# Patient Record
Sex: FEMALE | Race: WHITE | NOT HISPANIC OR LATINO | Employment: FULL TIME | URBAN - METROPOLITAN AREA
[De-identification: names, ages, dates, MRNs, and addresses within clinical notes are randomized per-mention and may not be internally consistent; named-entity substitution may affect disease eponyms.]

---

## 2019-02-11 ENCOUNTER — OFFICE VISIT (OUTPATIENT)
Dept: URGENT CARE | Facility: PHYSICIAN GROUP | Age: 54
End: 2019-02-11
Payer: COMMERCIAL

## 2019-02-11 VITALS
BODY MASS INDEX: 32.99 KG/M2 | DIASTOLIC BLOOD PRESSURE: 70 MMHG | OXYGEN SATURATION: 93 % | SYSTOLIC BLOOD PRESSURE: 112 MMHG | HEART RATE: 105 BPM | TEMPERATURE: 97.4 F | HEIGHT: 65 IN | WEIGHT: 198 LBS

## 2019-02-11 DIAGNOSIS — J02.9 PHARYNGITIS, UNSPECIFIED ETIOLOGY: ICD-10-CM

## 2019-02-11 PROCEDURE — 99204 OFFICE O/P NEW MOD 45 MIN: CPT | Performed by: FAMILY MEDICINE

## 2019-02-11 RX ORDER — AZITHROMYCIN 250 MG/1
TABLET, FILM COATED ORAL
Qty: 6 TAB | Refills: 0 | Status: SHIPPED | OUTPATIENT
Start: 2019-02-11 | End: 2023-04-21

## 2019-02-11 ASSESSMENT — ENCOUNTER SYMPTOMS
SWOLLEN GLANDS: 1
NAUSEA: 0
HOARSE VOICE: 0
VOMITING: 0
TROUBLE SWALLOWING: 1
SHORTNESS OF BREATH: 0
DIZZINESS: 0
STRIDOR: 0
SORE THROAT: 0
MYALGIAS: 0
FEVER: 1
CHILLS: 1
EYE PAIN: 0

## 2019-02-11 NOTE — PROGRESS NOTES
"  Subjective:     Maddie Bey is a 53 y.o. female who presents for Pharyngitis (fever x 3 days)       Pharyngitis    This is a new problem. The current episode started in the past 7 days. The problem has been rapidly worsening. The pain is severe. Associated symptoms include swollen glands and trouble swallowing. Pertinent negatives include no hoarse voice, shortness of breath, stridor or vomiting.   History reviewed. No pertinent past medical history.History reviewed. No pertinent surgical history.  Social History     Social History   • Marital status:      Spouse name: N/A   • Number of children: N/A   • Years of education: N/A     Occupational History   • Not on file.     Social History Main Topics   • Smoking status: Former Smoker   • Smokeless tobacco: Never Used   • Alcohol use No   • Drug use: No   • Sexual activity: Not on file     Other Topics Concern   • Not on file     Social History Narrative   • No narrative on file      Family History   Problem Relation Age of Onset   • Heart Disease Paternal Grandfather    • Stroke Neg Hx     Review of Systems   Constitutional: Positive for chills and fever.   HENT: Positive for trouble swallowing. Negative for hoarse voice and sore throat.    Eyes: Negative for pain.   Respiratory: Negative for shortness of breath and stridor.    Cardiovascular: Negative for chest pain.   Gastrointestinal: Negative for nausea and vomiting.   Genitourinary: Negative for hematuria.   Musculoskeletal: Negative for myalgias.   Skin: Negative for rash.   Neurological: Negative for dizziness.     Allergies   Allergen Reactions   • Penicillins Anaphylaxis     Anaphylaxis      Objective:   /70   Pulse (!) 105   Temp 36.3 °C (97.4 °F) (Temporal)   Ht 1.651 m (5' 5\")   Wt 89.8 kg (198 lb)   SpO2 93%   BMI 32.95 kg/m²   Physical Exam   Constitutional: She is oriented to person, place, and time. She appears well-developed and well-nourished. No distress.   HENT:   Head: " Normocephalic and atraumatic.   Mouth/Throat: Uvula is midline. Posterior oropharyngeal edema and posterior oropharyngeal erythema present. No tonsillar abscesses.   Eyes: Pupils are equal, round, and reactive to light. Conjunctivae and EOM are normal.   Cardiovascular: Normal rate and regular rhythm.    No murmur heard.  Pulmonary/Chest: Effort normal and breath sounds normal. No respiratory distress.   Abdominal: Soft. She exhibits no distension. There is no tenderness.   Lymphadenopathy:     She has cervical adenopathy.        Right cervical: Superficial cervical adenopathy present.        Left cervical: Superficial cervical adenopathy present.        Right: No supraclavicular adenopathy present.        Left: No supraclavicular adenopathy present.   Neurological: She is alert and oriented to person, place, and time. She has normal reflexes. No sensory deficit.   Skin: Skin is warm and dry.   Psychiatric: She has a normal mood and affect.         Assessment/Plan:   Assessment    1. Pharyngitis, unspecified etiology  - azithromycin (ZITHROMAX) 250 MG Tab; Take 2 tablets by mouth on day one. Take one tablet by mouth the remaining days until gone  Dispense: 6 Tab; Refill: 0    Differential diagnosis, natural history, supportive care, and indications for immediate follow-up discussed.

## 2022-12-14 ENCOUNTER — OFFICE VISIT (OUTPATIENT)
Dept: URGENT CARE | Facility: PHYSICIAN GROUP | Age: 57
End: 2022-12-14
Payer: COMMERCIAL

## 2022-12-14 VITALS
DIASTOLIC BLOOD PRESSURE: 78 MMHG | BODY MASS INDEX: 31.39 KG/M2 | RESPIRATION RATE: 16 BRPM | SYSTOLIC BLOOD PRESSURE: 118 MMHG | HEART RATE: 92 BPM | WEIGHT: 188.4 LBS | HEIGHT: 65 IN | OXYGEN SATURATION: 96 % | TEMPERATURE: 98.9 F

## 2022-12-14 DIAGNOSIS — R59.9 SWOLLEN LYMPH NODES: ICD-10-CM

## 2022-12-14 DIAGNOSIS — J03.90 TONSILLITIS: ICD-10-CM

## 2022-12-14 DIAGNOSIS — J02.9 SORE THROAT: ICD-10-CM

## 2022-12-14 LAB
INT CON NEG: NEGATIVE
INT CON POS: POSITIVE
S PYO AG THROAT QL: NEGATIVE

## 2022-12-14 PROCEDURE — 87880 STREP A ASSAY W/OPTIC: CPT | Performed by: NURSE PRACTITIONER

## 2022-12-14 PROCEDURE — 99213 OFFICE O/P EST LOW 20 MIN: CPT | Performed by: NURSE PRACTITIONER

## 2022-12-14 RX ORDER — PREDNISONE 20 MG/1
20 TABLET ORAL DAILY
Qty: 5 TABLET | Refills: 0 | Status: SHIPPED | OUTPATIENT
Start: 2022-12-14 | End: 2022-12-19

## 2022-12-14 RX ORDER — CLINDAMYCIN HYDROCHLORIDE 300 MG/1
300 CAPSULE ORAL 3 TIMES DAILY
Qty: 30 CAPSULE | Refills: 0 | Status: SHIPPED | OUTPATIENT
Start: 2022-12-14 | End: 2022-12-24

## 2022-12-14 ASSESSMENT — ENCOUNTER SYMPTOMS
NAUSEA: 0
DIARRHEA: 0
VOMITING: 0
CHILLS: 0
MYALGIAS: 0
NECK PAIN: 0
SPUTUM PRODUCTION: 0
EYE REDNESS: 0
FEVER: 0
CONSTIPATION: 0
COUGH: 1
WHEEZING: 0
ABDOMINAL PAIN: 0
DIZZINESS: 0
HEADACHES: 0
WEAKNESS: 0
EYE DISCHARGE: 0
SORE THROAT: 1
SHORTNESS OF BREATH: 0
SINUS PAIN: 0

## 2022-12-14 NOTE — PROGRESS NOTES
Subjective     Maddie Bey is a 56 y.o. female who presents with Adenopathy and Pharyngitis (Congestion, dayquill, decongestant)            Pharyngitis   Associated symptoms include congestion and coughing. Pertinent negatives include no abdominal pain, diarrhea, ear pain, headaches, neck pain, shortness of breath or vomiting. Experiencing sore throat with swollen lymph nodes, nasal congestion, postnasal drainage and sore throat x1 week.  Using over-the-counter oral multisymptom decongestant and DayQuil.  Salt water gargle twice daily.  No nasal spray or rinse.  Denies fever, malaise or body aches, mild cough from postnasal drainage.    PMH:  has no past medical history of Asthma, Cancer ([facility]), or Diabetes ([facility]).  MEDS:   Current Outpatient Medications:     azithromycin (ZITHROMAX) 250 MG Tab, Take 2 tablets by mouth on day one. Take one tablet by mouth the remaining days until gone, Disp: 6 Tab, Rfl: 0  ALLERGIES:   Allergies   Allergen Reactions    Penicillins Anaphylaxis     Anaphylaxis     SURGHX: History reviewed. No pertinent surgical history.  SOCHX:  reports that she has quit smoking. She has never used smokeless tobacco. She reports that she does not drink alcohol and does not use drugs.  FH: Family history was reviewed, no pertinent findings to report      Review of Systems   Constitutional:  Negative for chills, fever and malaise/fatigue.   HENT:  Positive for congestion and sore throat. Negative for ear pain and sinus pain.    Eyes:  Negative for discharge and redness.   Respiratory:  Positive for cough. Negative for sputum production, shortness of breath and wheezing.    Gastrointestinal:  Negative for abdominal pain, constipation, diarrhea, nausea and vomiting.   Musculoskeletal:  Negative for myalgias and neck pain.   Skin:  Negative for itching and rash.   Neurological:  Negative for dizziness, weakness and headaches.   Endo/Heme/Allergies:  Negative for environmental allergies.   All other systems  "reviewed and are negative.           Objective     /78   Pulse 92   Temp 37.2 °C (98.9 °F) (Temporal)   Resp 16   Ht 1.651 m (5' 5\")   Wt 85.5 kg (188 lb 6.4 oz)   SpO2 96%   BMI 31.35 kg/m²      Physical Exam  Vitals reviewed.   Constitutional:       General: She is awake. She is not in acute distress.     Appearance: Normal appearance. She is well-developed. She is not ill-appearing, toxic-appearing or diaphoretic.   HENT:      Head: Normocephalic.      Right Ear: Ear canal and external ear normal. A middle ear effusion is present.      Left Ear: Ear canal and external ear normal. A middle ear effusion is present.      Nose: Mucosal edema, congestion and rhinorrhea present.      Mouth/Throat:      Lips: Pink.      Mouth: Mucous membranes are dry.      Pharynx: Uvula midline. Pharyngeal swelling and posterior oropharyngeal erythema present. No oropharyngeal exudate or uvula swelling.      Tonsils: No tonsillar exudate or tonsillar abscesses. 2+ on the right. 2+ on the left.   Eyes:      Conjunctiva/sclera: Conjunctivae normal.      Pupils: Pupils are equal, round, and reactive to light.   Cardiovascular:      Rate and Rhythm: Normal rate.   Pulmonary:      Effort: Pulmonary effort is normal.      Breath sounds: Normal breath sounds and air entry.   Musculoskeletal:         General: Normal range of motion.      Cervical back: Normal range of motion and neck supple.   Lymphadenopathy:      Head:      Right side of head: Submandibular and tonsillar adenopathy present.      Left side of head: Submandibular and tonsillar adenopathy present.      Cervical: No cervical adenopathy.   Skin:     General: Skin is warm and dry.   Neurological:      Mental Status: She is alert and oriented to person, place, and time.   Psychiatric:         Attention and Perception: Attention normal.         Mood and Affect: Mood normal.         Speech: Speech normal.         Behavior: Behavior normal. Behavior is cooperative. "                           Assessment & Plan        1. Sore throat    - POCT Rapid Strep A    2. Tonsillitis    - clindamycin (CLEOCIN) 300 MG Cap; Take 1 Capsule by mouth 3 times a day for 10 days. Take with food, probiotic and plenty of water throughout the day.  Dispense: 30 Capsule; Refill: 0    3. Swollen lymph nodes    - predniSONE (DELTASONE) 20 MG Tab; Take 1 Tablet by mouth every day for 5 days.    Dispense: 5 Tablet; Refill: 0    Opt to dispense clindamycin at this time as there is a severe allergic reaction to penicillins  Recommend to discontinue oral multisymptom decongestant at this time  -Maintain hydration/water intake  -May use over the counter Ibuprofen/Tylenol as needed for any fever, body aches or throat pain  -May take long acting antihistamine for seasonal allergy symptoms as needed  -May use over the counter saline nasal spray for nasal congestion as needed  -May use over the counter Nasacort/Flonase for nasal congestion as needed   -May use throat lozenges for throat discomfort as needed   -Change toothbrush after 24 hrs of initiating antibiotics   -May gargle with salt water up to 4x/day as needed for throat discomfort (1 tsp salt dissolved in 1 cup warm water)  -May drink smoothies for nutrition if too painful to swallow solid foods  -Monitor for any sinus pain/pressure with sinus congestion with thick mucus production, sinus headache, cough, shortness of breath, fever- need re-evaluation

## 2023-05-24 ENCOUNTER — OFFICE VISIT (OUTPATIENT)
Dept: URGENT CARE | Facility: PHYSICIAN GROUP | Age: 58
End: 2023-05-24
Payer: COMMERCIAL

## 2023-05-24 VITALS
OXYGEN SATURATION: 97 % | WEIGHT: 174 LBS | RESPIRATION RATE: 18 BRPM | BODY MASS INDEX: 29.71 KG/M2 | DIASTOLIC BLOOD PRESSURE: 62 MMHG | SYSTOLIC BLOOD PRESSURE: 124 MMHG | HEART RATE: 95 BPM | TEMPERATURE: 98.5 F | HEIGHT: 64 IN

## 2023-05-24 DIAGNOSIS — R19.00 ABDOMINAL SWELLING: ICD-10-CM

## 2023-05-24 PROCEDURE — 3074F SYST BP LT 130 MM HG: CPT | Performed by: FAMILY MEDICINE

## 2023-05-24 PROCEDURE — 99213 OFFICE O/P EST LOW 20 MIN: CPT | Performed by: FAMILY MEDICINE

## 2023-05-24 PROCEDURE — 3078F DIAST BP <80 MM HG: CPT | Performed by: FAMILY MEDICINE

## 2023-05-24 NOTE — PROGRESS NOTES
"  Subjective:      57 y.o. female presents to urgent care for lower quadrant abdominal swelling.  This for started about 1 month ago and has been happening intermittently since then.  There is no inciting event or trauma at that time.  She is unsure of alleviating or aggravating factors.  There is no associated pain.  She does have history of nephrectomy on that side.  Bowel movements are regular.  No changes to urinary urgency, frequency, dysuria, or hematuria.    She denies any other questions or concerns at this time.    Current problem list, medication, and past medical/surgical history were reviewed in Epic.    ROS  See HPI     Objective:      /62 (BP Location: Left arm, Patient Position: Sitting, BP Cuff Size: Adult)   Pulse 95   Temp 36.9 °C (98.5 °F) (Temporal)   Resp 18   Ht 1.626 m (5' 4\")   Wt 78.9 kg (174 lb)   SpO2 97%   BMI 29.87 kg/m²     Physical Exam  Constitutional:       General: She is not in acute distress.     Appearance: She is not diaphoretic.   Cardiovascular:      Rate and Rhythm: Normal rate and regular rhythm.      Heart sounds: Normal heart sounds.   Pulmonary:      Effort: Pulmonary effort is normal. No respiratory distress.      Breath sounds: Normal breath sounds.   Abdominal:      Comments: Scar around her right lower abdominal/torso noted.  No deformities or discolorations noted when patient is laying down to abdomen.  When she stands up the right lower abdominal side is more to the left.  It is firm to palpation.  No associated pain.   Neurological:      Mental Status: She is alert.   Psychiatric:         Mood and Affect: Affect normal.         Judgment: Judgment normal.       Assessment/Plan:     1. Abdominal swelling  CT has been ordered and scheduled for tomorrow which was the soonest available appointment.  We will treat appropriately once resulted.  - CT-ABDOMEN-PELVIS WITH; Future      Instructed to return to Urgent Care or nearest Emergency Department if " symptoms fail to improve, for any change in condition, further concerns, or new concerning symptoms. Patient states understanding of the plan of care and discharge instructions.    Shakira Schreiber M.D.

## 2023-05-25 ENCOUNTER — HOSPITAL ENCOUNTER (OUTPATIENT)
Dept: RADIOLOGY | Facility: MEDICAL CENTER | Age: 58
End: 2023-05-25
Attending: FAMILY MEDICINE
Payer: COMMERCIAL

## 2023-05-25 DIAGNOSIS — R19.00 ABDOMINAL SWELLING: ICD-10-CM

## 2023-05-25 PROCEDURE — 74177 CT ABD & PELVIS W/CONTRAST: CPT

## 2023-05-25 PROCEDURE — 700117 HCHG RX CONTRAST REV CODE 255: Performed by: FAMILY MEDICINE

## 2023-05-25 RX ADMIN — IOHEXOL 25 ML: 240 INJECTION, SOLUTION INTRATHECAL; INTRAVASCULAR; INTRAVENOUS; ORAL at 13:20

## 2023-05-25 RX ADMIN — IOHEXOL 75 ML: 350 INJECTION, SOLUTION INTRAVENOUS at 14:20

## 2024-05-01 ENCOUNTER — APPOINTMENT (OUTPATIENT)
Dept: RADIOLOGY | Facility: MEDICAL CENTER | Age: 59
End: 2024-05-01
Payer: COMMERCIAL

## 2024-05-10 ENCOUNTER — HOSPITAL ENCOUNTER (OUTPATIENT)
Dept: RADIOLOGY | Facility: MEDICAL CENTER | Age: 59
End: 2024-05-10
Attending: OBSTETRICS & GYNECOLOGY
Payer: COMMERCIAL

## 2024-05-10 DIAGNOSIS — Z12.39 SCREENING BREAST EXAMINATION: ICD-10-CM

## 2024-11-04 ENCOUNTER — OFFICE VISIT (OUTPATIENT)
Dept: URGENT CARE | Facility: PHYSICIAN GROUP | Age: 59
End: 2024-11-04
Payer: COMMERCIAL

## 2024-11-04 VITALS
OXYGEN SATURATION: 99 % | BODY MASS INDEX: 29.12 KG/M2 | HEIGHT: 66 IN | WEIGHT: 181.22 LBS | TEMPERATURE: 97.8 F | RESPIRATION RATE: 15 BRPM | HEART RATE: 66 BPM | DIASTOLIC BLOOD PRESSURE: 76 MMHG | SYSTOLIC BLOOD PRESSURE: 120 MMHG

## 2024-11-04 DIAGNOSIS — R51.9 ACUTE INTRACTABLE HEADACHE, UNSPECIFIED HEADACHE TYPE: ICD-10-CM

## 2024-11-04 DIAGNOSIS — R09.81 NASAL CONGESTION: ICD-10-CM

## 2024-11-04 DIAGNOSIS — M54.2 NECK PAIN: ICD-10-CM

## 2024-11-04 DIAGNOSIS — R42 DIZZINESS: ICD-10-CM

## 2024-11-04 DIAGNOSIS — R68.83 CHILLS: ICD-10-CM

## 2024-11-04 LAB
FLUAV RNA SPEC QL NAA+PROBE: NEGATIVE
FLUBV RNA SPEC QL NAA+PROBE: NEGATIVE
GLUCOSE BLD-MCNC: 90 MG/DL (ref 65–99)
RSV RNA SPEC QL NAA+PROBE: NEGATIVE
SARS-COV-2 RNA RESP QL NAA+PROBE: NEGATIVE

## 2024-11-04 PROCEDURE — 3074F SYST BP LT 130 MM HG: CPT | Performed by: NURSE PRACTITIONER

## 2024-11-04 PROCEDURE — 3078F DIAST BP <80 MM HG: CPT | Performed by: NURSE PRACTITIONER

## 2024-11-04 PROCEDURE — 99213 OFFICE O/P EST LOW 20 MIN: CPT | Performed by: NURSE PRACTITIONER

## 2024-11-04 PROCEDURE — 82962 GLUCOSE BLOOD TEST: CPT | Performed by: NURSE PRACTITIONER

## 2024-11-04 PROCEDURE — 0241U POCT CEPHEID COV-2, FLU A/B, RSV - PCR: CPT | Performed by: NURSE PRACTITIONER

## 2024-11-04 RX ORDER — CYCLOBENZAPRINE HCL 10 MG
10 TABLET ORAL EVERY 8 HOURS PRN
Qty: 30 TABLET | Refills: 0 | Status: SHIPPED | OUTPATIENT
Start: 2024-11-04

## 2024-11-04 RX ORDER — KETOROLAC TROMETHAMINE 15 MG/ML
15 INJECTION, SOLUTION INTRAMUSCULAR; INTRAVENOUS ONCE
Status: COMPLETED | OUTPATIENT
Start: 2024-11-04 | End: 2024-11-04

## 2024-11-04 RX ADMIN — KETOROLAC TROMETHAMINE 15 MG: 15 INJECTION, SOLUTION INTRAMUSCULAR; INTRAVENOUS at 11:04

## 2024-11-04 ASSESSMENT — ENCOUNTER SYMPTOMS
CHILLS: 1
SHORTNESS OF BREATH: 0
SORE THROAT: 0
NAUSEA: 1
NECK PAIN: 1
SENSORY CHANGE: 0
HEADACHES: 1
COUGH: 0
PHOTOPHOBIA: 1
WEAKNESS: 0
TINGLING: 1
VOMITING: 0
PALPITATIONS: 0
DIZZINESS: 1
CARDIOVASCULAR NEGATIVE: 1

## 2024-11-04 ASSESSMENT — VISUAL ACUITY: OU: 1

## 2024-11-04 NOTE — PROGRESS NOTES
Subjective:     Maddie Bey is a 58 y.o. female who presents for Dizziness (Neck pain, jaw and face px, headache, nauseas x 1 day)       Dizziness  This is a new problem. The problem has been gradually worsening. Associated symptoms include chills, congestion, headaches, nausea and neck pain (Bilateral). Pertinent negatives include no chest pain, coughing, sore throat, vomiting or weakness.     History of Present Illness  The patient is a 58-year-old female who presents for evaluation of headache, neck pain, nausea, dizziness, and facial pain. She is accompanied by her wife.    She reports experiencing a frontal headache that began yesterday afternoon. The headache is rated as a 7 on a scale of 0 to 10, is consistent, and is accompanied by light sensitivity. She experiences pain when moving her head up and down. She denies any history of migraines or similar symptoms. She has been taking aspirin for her symptoms but reports no relief.    She describes bilateral neck pain extending to her shoulders. The pain intensifies with changes in position, such as standing from a seated position. She also reports a sensation of tightness in her jaw and numbness in her cheeks and jaw bilaterally.    She experiences dizziness upon standing and reports feeling off balance and lightheaded, occasionally feeling as though she might faint. No spinning sensation. She denies any numbness or tingling in her legs. She denies any ear pain, nasal congestion, or runny nose. However, she did experience fever and chills yesterday.    She has a history of back pain but has not been formally diagnosed with any chronic neck or back conditions. She denies any history of head injury, chest pain, shortness of breath, or sore throat.    DOM[employer] Copilot used during this encounter with the consent of the patient.     Review of Systems   Constitutional:  Positive for chills. Negative for malaise/fatigue.   HENT:  Positive for congestion. Negative for ear  "pain and sore throat.         Bilateral face, jaw pain   Eyes:  Positive for photophobia.   Respiratory:  Negative for cough and shortness of breath.    Cardiovascular: Negative.  Negative for chest pain and palpitations.   Gastrointestinal:  Positive for nausea. Negative for vomiting.   Musculoskeletal:  Positive for neck pain (Bilateral).   Neurological:  Positive for dizziness, tingling (Bilateral face) and headaches. Negative for sensory change and weakness.   All other systems reviewed and are negative.    Refer to HPI for additional details.    During this visit, appropriate PPE was worn, and hand hygiene was performed.    PMH:  has no past medical history of Asthma, Cancer ([facility]), or Diabetes ([facility]).    MEDS:   Current Outpatient Medications:     cyclobenzaprine (FLEXERIL) 10 mg Tab, Take 1 Tablet by mouth every 8 hours as needed for Muscle Spasms., Disp: 30 Tablet, Rfl: 0    ALLERGIES:   Allergies   Allergen Reactions    Penicillins Anaphylaxis     Anaphylaxis     SURGHX: History reviewed. No pertinent surgical history.  SOCHX:  reports that she has quit smoking. She has never used smokeless tobacco. She reports that she does not drink alcohol and does not use drugs.    FH: Per HPI as applicable/pertinent.      Objective:     /76   Pulse 66   Temp 36.6 °C (97.8 °F)   Resp 15   Ht 1.676 m (5' 6\")   Wt 82.2 kg (181 lb 3.5 oz)   SpO2 99%   BMI 29.25 kg/m²     Physical Exam  Nursing note reviewed.   Constitutional:       General: She is not in acute distress.     Appearance: She is well-developed. She is not ill-appearing or toxic-appearing.   HENT:      Right Ear: Tympanic membrane normal.      Left Ear: Tympanic membrane normal.      Nose:      Right Sinus: No maxillary sinus tenderness or frontal sinus tenderness.      Left Sinus: No maxillary sinus tenderness or frontal sinus tenderness.   Eyes:      General: Vision grossly intact.      Extraocular Movements: Extraocular movements intact.      " Conjunctiva/sclera: Conjunctivae normal.      Pupils: Pupils are equal, round, and reactive to light.      Comments: Negative Teresa-Hallpike   Cardiovascular:      Rate and Rhythm: Normal rate.   Pulmonary:      Effort: Pulmonary effort is normal. No respiratory distress.   Musculoskeletal:         General: No deformity.      Right shoulder: Normal strength.      Left shoulder: Normal strength.      Right hand: Normal strength.      Left hand: Normal strength.      Cervical back: Muscular tenderness present. No spinous process tenderness. Decreased range of motion.      Right hip: Normal strength.      Left hip: Normal strength.   Skin:     Coloration: Skin is not pale.   Neurological:      Mental Status: She is alert and oriented to person, place, and time.      GCS: GCS eye subscore is 4. GCS verbal subscore is 5. GCS motor subscore is 6.      Cranial Nerves: No dysarthria or facial asymmetry.      Motor: No weakness.   Psychiatric:         Mood and Affect: Mood normal.         Behavior: Behavior normal. Behavior is cooperative.         Thought Content: Thought content normal.         Judgment: Judgment normal.     POCT Cepheid CoV-2, Flu A/B, RSV by PCR: negative    POCT glucose: 90      Assessment/Plan:     1. Acute intractable headache, unspecified headache type  - ketorolac (Toradol) 15 MG/ML injection 15 mg    2. Dizziness  - POCT CEPHEID COV-2, FLU A/B, RSV - PCR    3. Chills  - POCT Glucose    4. Neck pain  - cyclobenzaprine (FLEXERIL) 10 mg Tab; Take 1 Tablet by mouth every 8 hours as needed for Muscle Spasms.  Dispense: 30 Tablet; Refill: 0    5. Nasal congestion    Toradol IM given in clinic.  Patient tolerated well.  No complications.    Rx as above electronically.  Recommend warm compress Famous + to bilateral neck.  Start OTC acetaminophen as needed for pain.  Avoid aspirin or further NSAIDs for the next 24 hours.    Monitor. Follow up in 2 days if symptoms do not improve or sooner if symptoms change or  worsen.     Rx as above sent electronically.     Differential diagnosis, natural history, supportive care, over-the-counter symptom management per 's instructions, close monitoring, and indications for immediate follow-up discussed.     All questions answered. Patient/wife agrees with the plan of care.    Discharge summary provided via [employer].    Work note provided.

## 2024-11-04 NOTE — LETTER
November 4, 2024         Patient: Maddie Bey   YOB: 1965   Date of Visit: 11/4/2024           To Whom it May Concern:    Maddie Bey was seen in my clinic on 11/4/2024. Please excuse from work 11/5 and 11/6.    If you have any questions or concerns, please don't hesitate to call.        Sincerely,         MANUEL Piedra.  Electronically Signed

## 2025-01-16 ENCOUNTER — OFFICE VISIT (OUTPATIENT)
Dept: URGENT CARE | Facility: PHYSICIAN GROUP | Age: 60
End: 2025-01-16
Payer: COMMERCIAL

## 2025-01-16 VITALS
HEIGHT: 66 IN | TEMPERATURE: 97.2 F | SYSTOLIC BLOOD PRESSURE: 126 MMHG | DIASTOLIC BLOOD PRESSURE: 74 MMHG | RESPIRATION RATE: 18 BRPM | HEART RATE: 66 BPM | OXYGEN SATURATION: 98 % | WEIGHT: 178.6 LBS | BODY MASS INDEX: 28.7 KG/M2

## 2025-01-16 DIAGNOSIS — R51.9 ACUTE NONINTRACTABLE HEADACHE, UNSPECIFIED HEADACHE TYPE: ICD-10-CM

## 2025-01-16 DIAGNOSIS — R68.89 FLU-LIKE SYMPTOMS: ICD-10-CM

## 2025-01-16 LAB
FLUAV RNA SPEC QL NAA+PROBE: NEGATIVE
FLUBV RNA SPEC QL NAA+PROBE: NEGATIVE
RSV RNA SPEC QL NAA+PROBE: NEGATIVE
SARS-COV-2 RNA RESP QL NAA+PROBE: NEGATIVE

## 2025-01-16 PROCEDURE — 3078F DIAST BP <80 MM HG: CPT | Performed by: PHYSICIAN ASSISTANT

## 2025-01-16 PROCEDURE — 0241U POCT CEPHEID COV-2, FLU A/B, RSV - PCR: CPT | Performed by: PHYSICIAN ASSISTANT

## 2025-01-16 PROCEDURE — 3074F SYST BP LT 130 MM HG: CPT | Performed by: PHYSICIAN ASSISTANT

## 2025-01-16 PROCEDURE — 99214 OFFICE O/P EST MOD 30 MIN: CPT | Performed by: PHYSICIAN ASSISTANT

## 2025-01-16 RX ORDER — KETOROLAC TROMETHAMINE 15 MG/ML
15 INJECTION, SOLUTION INTRAMUSCULAR; INTRAVENOUS ONCE
Status: COMPLETED | OUTPATIENT
Start: 2025-01-16 | End: 2025-01-16

## 2025-01-16 RX ADMIN — KETOROLAC TROMETHAMINE 15 MG: 15 INJECTION, SOLUTION INTRAMUSCULAR; INTRAVENOUS at 17:55

## 2025-01-16 ASSESSMENT — ENCOUNTER SYMPTOMS
MYALGIAS: 1
PALPITATIONS: 0
HEADACHES: 1
CHILLS: 1
COUGH: 1
FEVER: 0
NAUSEA: 1

## 2025-01-16 NOTE — LETTER
January 16, 2025         Patient: Maddie Bey   YOB: 1965   Date of Visit: 1/16/2025           To Whom it May Concern:    Maddie Bey was seen in my clinic on 1/16/2025.  Please excuse her absence today through Sunday.    If you have any questions or concerns, please don't hesitate to call.        Sincerely,           Franklin Ortiz P.A.-C.  Electronically Signed

## 2025-01-17 NOTE — PROGRESS NOTES
"  Subjective:   Maddie Bey is a 59 y.o. female who presents today with   Chief Complaint   Patient presents with    Headache     Severe headache, dizzy, nasal pain, nausea started this morning     Other  This is a new problem. The current episode started today. The problem occurs constantly. Associated symptoms include chills, congestion, coughing, headaches, myalgias and nausea. Pertinent negatives include no chest pain or fever. Treatments tried: aspirin. The treatment provided no relief.     Patient has been seen for similar acute intractable headache back in November.    PMH:  has no past medical history of Asthma, Cancer ([facility]), or Diabetes ([facility]).  MEDS:   Current Outpatient Medications:     cyclobenzaprine (FLEXERIL) 10 mg Tab, Take 1 Tablet by mouth every 8 hours as needed for Muscle Spasms., Disp: 30 Tablet, Rfl: 0    azithromycin (ZITHROMAX) 250 MG TABS, 2 tabs by mouth day 1, 1 tab by mouth days 2-5, Disp: 6 Each, Rfl: 0    guaifenesin-codeine (ROBITUSSIN AC) SOLN, Take 5-10 mL by mouth every 6 hours as needed for Cough., Disp: 200 mL, Rfl: 1  ALLERGIES:   Allergies   Allergen Reactions    Nkda [No Known Drug Allergy]     Penicillins Anaphylaxis     Anaphylaxis     SURGHX:   Past Surgical History:   Procedure Laterality Date    OTHER      right kidney removed 1989     SOCHX:  reports that she has quit smoking. She has never used smokeless tobacco. She reports that she does not drink alcohol and does not use drugs.  FH: Reviewed with patient, not pertinent to this visit.     Review of Systems   Constitutional:  Positive for chills. Negative for fever.   HENT:  Positive for congestion.    Respiratory:  Positive for cough.    Cardiovascular:  Negative for chest pain and palpitations.   Gastrointestinal:  Positive for nausea.   Musculoskeletal:  Positive for myalgias.   Neurological:  Positive for headaches.      Objective:   /74   Pulse 66   Temp 36.2 °C (97.2 °F)   Resp 18   Ht 1.676 m (5' 6\")   " Wt 81 kg (178 lb 9.6 oz)   SpO2 98%   BMI 28.83 kg/m²   Physical Exam  Vitals and nursing note reviewed.   Constitutional:       General: She is not in acute distress.     Appearance: Normal appearance. She is well-developed. She is not ill-appearing or toxic-appearing.   HENT:      Head: Normocephalic and atraumatic.      Right Ear: Hearing normal.      Left Ear: Hearing normal.      Nose: Congestion present.      Mouth/Throat:      Mouth: Mucous membranes are moist.      Pharynx: No oropharyngeal exudate or posterior oropharyngeal erythema.   Eyes:      General: No visual field deficit.     Extraocular Movements: Extraocular movements intact.      Pupils: Pupils are equal, round, and reactive to light.   Cardiovascular:      Rate and Rhythm: Normal rate and regular rhythm.      Heart sounds: Normal heart sounds.   Pulmonary:      Effort: Pulmonary effort is normal. No respiratory distress.      Breath sounds: Normal breath sounds. No stridor. No wheezing, rhonchi or rales.   Musculoskeletal:      Comments: Normal movement in all 4 extremities   Skin:     General: Skin is warm and dry.   Neurological:      General: No focal deficit present.      Mental Status: She is alert and oriented to person, place, and time.      GCS: GCS eye subscore is 4. GCS verbal subscore is 5. GCS motor subscore is 6.      Cranial Nerves: No cranial nerve deficit, dysarthria or facial asymmetry.      Motor: No weakness.      Coordination: Coordination normal.      Gait: Gait normal.   Psychiatric:         Mood and Affect: Mood normal.       COVID -  FLU -  RSV -    Assessment/Plan:   Assessment    1. Acute nonintractable headache, unspecified headache type  - ketorolac (Toradol) 15 MG/ML injection 15 mg    2. Flu-like symptoms  - POCT CoV-2, Flu A/B, RSV by PCR    Symptoms and presentation appear consistent with headache/migraine and possibly early onset of flulike symptoms or other viral illness.  Would recommend ruling out with viral  testing.  Recommend patient not take any further NSAIDs for at least 24 hours following Toradol shot.  No acute concerning deficit regarding migraine/headache symptoms.  With any new or worsening symptoms would suggest going to the ER.    Differential diagnosis, natural history, supportive care, and indications for immediate follow-up discussed.   Patient given instructions and understanding of medications and treatment.    If not improving in 3-5 days, F/U with PCP or return to UC if symptoms worsen.    Patient agreeable to plan.    Please note that this dictation was created using voice recognition software. I have made every reasonable attempt to correct obvious errors, but I expect that there are errors of grammar and possibly content that I did not discover before finalizing the note.    Franklin Ortiz PA-C

## 2025-07-08 ENCOUNTER — APPOINTMENT (OUTPATIENT)
Dept: RADIOLOGY | Facility: MEDICAL CENTER | Age: 60
End: 2025-07-08
Attending: NURSE PRACTITIONER
Payer: COMMERCIAL

## 2025-08-28 ENCOUNTER — HOSPITAL ENCOUNTER (OUTPATIENT)
Dept: RADIOLOGY | Facility: MEDICAL CENTER | Age: 60
End: 2025-08-28
Attending: NURSE PRACTITIONER
Payer: COMMERCIAL

## 2025-08-28 VITALS — BODY MASS INDEX: 26.03 KG/M2 | WEIGHT: 162 LBS | HEIGHT: 66 IN

## 2025-08-28 DIAGNOSIS — Z12.39 ENCOUNTER FOR OTHER SCREENING FOR MALIGNANT NEOPLASM OF BREAST: ICD-10-CM

## 2025-08-28 PROCEDURE — 77067 SCR MAMMO BI INCL CAD: CPT
